# Patient Record
Sex: FEMALE | Race: WHITE
[De-identification: names, ages, dates, MRNs, and addresses within clinical notes are randomized per-mention and may not be internally consistent; named-entity substitution may affect disease eponyms.]

---

## 2019-07-13 ENCOUNTER — HOSPITAL ENCOUNTER (OUTPATIENT)
Dept: HOSPITAL 47 - LABPAT | Age: 39
End: 2019-07-13
Attending: OBSTETRICS & GYNECOLOGY
Payer: COMMERCIAL

## 2019-07-13 DIAGNOSIS — Z01.812: Primary | ICD-10-CM

## 2019-07-13 LAB
BASOPHILS # BLD AUTO: 0 K/UL (ref 0–0.2)
BASOPHILS NFR BLD AUTO: 0 %
EOSINOPHIL # BLD AUTO: 0.3 K/UL (ref 0–0.7)
EOSINOPHIL NFR BLD AUTO: 4 %
ERYTHROCYTE [DISTWIDTH] IN BLOOD BY AUTOMATED COUNT: 4.29 M/UL (ref 3.8–5.4)
ERYTHROCYTE [DISTWIDTH] IN BLOOD: 14.6 % (ref 11.5–15.5)
HCT VFR BLD AUTO: 39 % (ref 34–46)
HGB BLD-MCNC: 12.4 GM/DL (ref 11.4–16)
LYMPHOCYTES # SPEC AUTO: 1.1 K/UL (ref 1–4.8)
LYMPHOCYTES NFR SPEC AUTO: 17 %
MCH RBC QN AUTO: 28.9 PG (ref 25–35)
MCHC RBC AUTO-ENTMCNC: 31.8 G/DL (ref 31–37)
MCV RBC AUTO: 90.9 FL (ref 80–100)
MONOCYTES # BLD AUTO: 0.4 K/UL (ref 0–1)
MONOCYTES NFR BLD AUTO: 6 %
NEUTROPHILS # BLD AUTO: 4.5 K/UL (ref 1.3–7.7)
NEUTROPHILS NFR BLD AUTO: 71 %
PLATELET # BLD AUTO: 228 K/UL (ref 150–450)
WBC # BLD AUTO: 6.4 K/UL (ref 3.8–10.6)

## 2019-07-13 PROCEDURE — 36415 COLL VENOUS BLD VENIPUNCTURE: CPT

## 2019-07-13 PROCEDURE — 85025 COMPLETE CBC W/AUTO DIFF WBC: CPT

## 2019-07-15 NOTE — P.HPOB
History of Present Illness


H&P Date: 07/15/19


Chief Complaint: Menorrhagia with irregular cycle, family-planning





This is a 39-year-old female  2 para 2 who presents for dilation and 

curettage with hysteroscopy and NovaSure endometrial ablation for menorrhagia 

with irregular cycle along with laparoscopic bilateral tubal ligation via 

fulguration for family planning.  Her menses are heavy for at least 1-2 days and

lasting at least 7 days along with coming every 24-26 days.  She also 

experiences a lot of back pain before her menses.  She would like definitive 

surgical treatment to control her bleeding issues and at the same time would 

like a tubal ligation for family planning.  Pelvic ultrasound showed uterus 

measuring 10.2 x 5.3 x 4.7 cm with endometrial thickness of 8 mm.  Both ovaries 

appeared normal.





Obstetrical history: .  History of 2  deliveries.


Gynecologic history: No history of sexually transmitted diseases.  She currently

uses withdrawal method for birth control.


Social history: She is .  She has a stable partner since .  She 

works at a [employer] company.





Review of Systems


Constitutional: Denies chills, Denies fever


Eyes: denies blurred vision, denies pain


Ears, nose, mouth and throat: Denies headache, Denies sore throat


Cardiovascular: Denies chest pain, Denies shortness of breath


Respiratory: Denies cough


Gastrointestinal: Denies abdominal pain, Denies diarrhea, Denies nausea, Denies 

vomiting


Genitourinary: Reports menorrhagia


Menstruation: Reports period heavy


Musculoskeletal: Reports low back pain


Integumentary: Denies pruritus, Denies rash


Neurological: Denies numbness, Denies weakness


Psychiatric: Denies anxiety, Denies depression





Past Medical History


Additional Past Medical History / Comment(s): PAINFUL IRREG. MENSES


History of Any Multi-Drug Resistant Organisms: None Reported


Past Surgical History:  Section (2)


Additional Past Surgical History / Comment(s): Laparoscopy with lysis of 

adhesions- 2011


Past Anesthesia/Blood Transfusion Reactions: No Reported Reaction


Past Psychological History: No Psychological Hx Reported


Smoking Status: Current every day smoker


Past Alcohol Use History: Occasional


Past Drug Use History: None Reported





- Past Family History


  ** Mother


Family Medical History: No Reported History





Medications and Allergies


                                Home Medications











 Medication  Instructions  Recorded  Confirmed  Type


 


No Known Home Medications  19 History








                                    Allergies











Allergy/AdvReac Type Severity Reaction Status Date / Time


 


No Known Allergies Allergy   Verified 19 15:04














Exam


Osteopathic Statement: *.  No significant issues noted on an osteopathic 

structural exam other than those noted in the History and Physical/Consult.





HEENT: Within normal limits


Heart: Regular rate and rhythm


Lungs: Clear to auscultation bilaterally


Abdomen: Soft, nontender


Pelvic exam: Uterus is anteverted, nontender, with no adnexal masses or 

tenderness noted.


Extremities: Negative Homans





Assessment and Plan


(1) Menorrhagia with irregular cycle


Status: Acute   Code(s): N92.1 - EXCESSIVE AND FREQUENT MENSTRUATION WITH IRREGU

LAR CYCLE   SNOMED Code(s): 273439286


   





(2) Family planning


Status: Acute   Code(s): Z30.09 - ENCOUNTER FOR OTH GENERAL CNSL AND ADVICE ON 

CONTRACEPTION   SNOMED Code(s): 740964493


   


Plan: 





Proceed with dilation and curettage with hysteroscopy and NovaSure endometrial 

ablation along with laparoscopic bilateral tubal ligation via fulguration.





I have discussed the risks, benefits, and alternative therapies for the above-

mentioned procedure and for both sedation/anesthesia as well as necessary blood 

products administration, if indicated, as they pertain to this patient.  The 

patient has indicated her understanding and acceptance of the risks and proc

edures discussed.

## 2019-07-16 ENCOUNTER — HOSPITAL ENCOUNTER (OUTPATIENT)
Dept: HOSPITAL 47 - OR | Age: 39
Discharge: HOME | End: 2019-07-16
Attending: OBSTETRICS & GYNECOLOGY
Payer: COMMERCIAL

## 2019-07-16 VITALS — SYSTOLIC BLOOD PRESSURE: 104 MMHG | HEART RATE: 52 BPM | DIASTOLIC BLOOD PRESSURE: 67 MMHG

## 2019-07-16 VITALS — RESPIRATION RATE: 16 BRPM

## 2019-07-16 VITALS — BODY MASS INDEX: 24.3 KG/M2

## 2019-07-16 VITALS — TEMPERATURE: 96.9 F

## 2019-07-16 DIAGNOSIS — N92.1: Primary | ICD-10-CM

## 2019-07-16 DIAGNOSIS — F17.200: ICD-10-CM

## 2019-07-16 DIAGNOSIS — N94.6: ICD-10-CM

## 2019-07-16 DIAGNOSIS — N93.8: ICD-10-CM

## 2019-07-16 DIAGNOSIS — Z30.2: ICD-10-CM

## 2019-07-16 PROCEDURE — 58563 HYSTEROSCOPY ABLATION: CPT

## 2019-07-16 PROCEDURE — 58670 LAPAROSCOPY TUBAL CAUTERY: CPT

## 2019-07-16 PROCEDURE — 88305 TISSUE EXAM BY PATHOLOGIST: CPT

## 2019-07-16 PROCEDURE — 81025 URINE PREGNANCY TEST: CPT

## 2019-07-16 RX ADMIN — HYDROMORPHONE HYDROCHLORIDE PRN MG: 1 INJECTION, SOLUTION INTRAMUSCULAR; INTRAVENOUS; SUBCUTANEOUS at 08:59

## 2019-07-16 RX ADMIN — HYDROMORPHONE HYDROCHLORIDE PRN MG: 1 INJECTION, SOLUTION INTRAMUSCULAR; INTRAVENOUS; SUBCUTANEOUS at 08:42

## 2019-07-16 RX ADMIN — HYDROMORPHONE HYDROCHLORIDE PRN MG: 1 INJECTION, SOLUTION INTRAMUSCULAR; INTRAVENOUS; SUBCUTANEOUS at 08:36

## 2019-07-16 NOTE — P.OP
Date of Procedure: 19


Preoperative Diagnosis: 


Menorrhagia with irregular cycle


Family planning


Postoperative Diagnosis: 


Same


Procedure(s) Performed: 


Dilation and curettage with hysteroscopy and NovaSure endometrial ablation


Laparoscopic bilateral tubal ligation via fulguration


Anesthesia: CHERYL


Surgeon: Lavonne Vega


Estimated Blood Loss (ml): 5


Pathology: other (Endometrial curettings)


Condition: stable


Disposition: same day


Indications for Procedure: 


This is a 39-year-old female  2 para 2 who presents for dilation and 

curettage with hysteroscopy and NovaSure endometrial ablation for menorrhagia 

with irregular cycle along with laparoscopic bilateral tubal ligation via 

fulguration for family planning.  Her menses are heavy for at least 1-2 days and

lasting at least 7 days along with coming every 24-26 days.  She also 

experiences a lot of back pain before her menses.  She would like definitive 

surgical treatment to control her bleeding issues and at the same time would 

like a tubal ligation for family planning.  Pelvic ultrasound showed uterus 

measuring 10.2 x 5.3 x 4.7 cm with endometrial thickness of 8 mm.  Both ovaries 

appeared normal.


Operative Findings: 


Uterus is anteverted and sounded to 11 cm.  Cervix is sounded to 3 cm.  Upon 

hysteroscopy, the patient was noted to be on her menses and visualization was 

difficult.  Both tubal ostia were visualized.  A dyssynchronous endometrial 

pattern was noted.  A moderate amount of endometrial curettings were obtained.  

On laparoscopy, there is a dense omental adhesion to the anterior abdominal wall

just above the uterus and the uterus is also adhered to the anterior abdominal 

wall.  Both ovaries appeared normal.  The left tube appeared normal.  The right 

tube had a large hydrosalpinx on the end of it.  Appendix was visualized and 

appeared normal.  Liver edge appeared clean.


Description of Procedure: 


The patient is taken to the operating room.  She is placed in the dorsal 

lithotomy position after general anesthesia was given.  She is prepped and 

draped in the normal sterile fashion.  Bladder is drained with a catheter and 

then removed.  Pelvic exam is performed under anesthesia.  Uterus is found to be

anteverted with no adnexal masses.  She is placed in slight Trendelenburg 

position.  A right angle retractor is used to visualize the cervix.  The 

anterior lip of the cervix is grasped with a single-tooth tenaculum.  Cervix is 

sounded to 3 cm.  Uterus is sounded to 11 cm.  Cervix is gently dilated with 

Lofton dilators until a hysteroscope could be passed.  Hysteroscopy is performed 

using normal saline.  The above noted findings are noted.  Next a polyp forceps 

is introduced.  Minimal amount of tissue was obtained.  Next medium-sized size 

sharp curette was placed.  A moderate amount of endometrial curettings were 

obtained.  Next NovaSure array was inserted into the endometrial cavity.  Length

was set at 6.5 cm and width was determined to be 3.3 cm.  Next cavity assessment

was completed and passed on the first try.  Next NovaSure array was fired at 118

W for 53 seconds.  Next the array was removed, inspected and then discarded.  

Next the hysteroscope was reinserted.  Uniform charring was noted.  Pictures 

were taken.  Hysteroscope was removed.  Next the kroner uterine manipulator was 

inserted through the cervical os and the balloon was inflated.  Single-tooth 

tenaculum was removed from the anterior lip of the cervix.  Minimal bleeding was

noted.  All other instruments removed from the vagina.  Sponge counts were 

correct.





Gloves were changed and attention was turned to the abdomen.  The infraumbilical

fold was grasped in transverse fashion with 2 Allis clamps.  A small transverse 

incision was made with a scalpel.  A hemostat was used to carry the incision 

down to the underlying layer of fascia.  A towel clip was placed above the 

umbilicus for retraction.  A 11 mm disposable bladeless trocar was then inserted

into the peritoneal cavity under direct visualization.  Once inside, 

pneumoperitoneum was achieved with CO2 gas.  The insert was removed and the 

camera was placed.  Intraperitoneal placement was confirmed.  No bleeding was 

noted.  Next the patient was placed in Trendelenburg position.  A small stab 

incision was made suprapubically and a 5 mm disposable bladeless trocar was 

inserted into the peritoneal cavity under direct visualization just to the left 

of the omental adhesion.  Once inside pelvic contents were inspected.  The above

noted findings are made.  Next a bipolar Kleppinger instrument was placed 

through the inferior trocar and the midportion of the left tube was brought away

from other structures and completely fulgurated on approximate 2-3 cm segment of

each tube.  Excellent hemostasis was noted.  A picture was taken.  Due to the 

large adhesion in the midline, the Kleppinger instrument was not able to reach 

the right fallopian tube.  Therefore another small stab incision was made in the

right lower quadrant and another 5 mm disposable bladeless trocar was inserted 

under direct visualization.  This trocar was then used to pass the Kleppinger 

instrument and the right fallopian tube was grasped and completely fulgurated on

approximate 2-3 cm segment of the tube.  The right fallopian tube was very dilat

ed at the end due to hydrosalpinx and therefore the more proximal portion of the

tube was fulgurated.  Pictures were taken.  Pneumoperitoneum was released after 

the inferior trocars were removed under direct visualization.  The upper trocar 

was then removed.  The fascial incision was closed with 0 Vicryl suture in 

interrupted figure-of-eight stitch.  The skin incisions were then closed with 4-

0 Vicryl suture in a subcuticular fashion.  Next the kroner uterine manipulator 

was removed.  Minimal bleeding was noted.  All sponge and needle counts are 

correct.  The patient is then taken to recovery room in stable condition.